# Patient Record
Sex: MALE | Race: WHITE | NOT HISPANIC OR LATINO | Employment: FULL TIME | URBAN - METROPOLITAN AREA
[De-identification: names, ages, dates, MRNs, and addresses within clinical notes are randomized per-mention and may not be internally consistent; named-entity substitution may affect disease eponyms.]

---

## 2019-09-17 RX ORDER — PAROXETINE HYDROCHLORIDE 20 MG/1
TABLET, FILM COATED ORAL
COMMUNITY
Start: 2019-03-25 | End: 2020-05-20

## 2019-09-17 RX ORDER — SIMVASTATIN 20 MG
TABLET ORAL
COMMUNITY
Start: 2017-02-09 | End: 2019-09-18

## 2019-09-17 RX ORDER — ATORVASTATIN CALCIUM 20 MG/1
20 TABLET, FILM COATED ORAL DAILY
COMMUNITY
Start: 2019-05-13 | End: 2020-05-12

## 2019-09-17 RX ORDER — CLOBETASOL PROPIONATE 0.5 MG/G
OINTMENT TOPICAL
COMMUNITY
End: 2019-09-18

## 2019-09-17 RX ORDER — LEVOTHYROXINE SODIUM 0.1 MG/1
100 TABLET ORAL DAILY
COMMUNITY
Start: 2019-06-28

## 2019-09-17 RX ORDER — FLUTICASONE PROPIONATE 0.05 %
CREAM (GRAM) TOPICAL
COMMUNITY
Start: 2014-09-26 | End: 2019-09-18

## 2019-09-18 ENCOUNTER — OFFICE VISIT (OUTPATIENT)
Dept: SURGERY | Facility: CLINIC | Age: 64
End: 2019-09-18

## 2019-09-18 VITALS
WEIGHT: 201 LBS | OXYGEN SATURATION: 97 % | SYSTOLIC BLOOD PRESSURE: 122 MMHG | BODY MASS INDEX: 28.77 KG/M2 | TEMPERATURE: 98.3 F | DIASTOLIC BLOOD PRESSURE: 90 MMHG | HEART RATE: 90 BPM | HEIGHT: 70 IN

## 2019-09-18 DIAGNOSIS — K64.8 INTERNAL HEMORRHOIDS WITH COMPLICATION: Primary | ICD-10-CM

## 2019-09-18 DIAGNOSIS — Z86.010 HISTORY OF COLON POLYPS: ICD-10-CM

## 2019-09-18 PROCEDURE — 46600 DIAGNOSTIC ANOSCOPY SPX: CPT | Performed by: COLON & RECTAL SURGERY

## 2019-09-18 PROCEDURE — 99203 OFFICE O/P NEW LOW 30 MIN: CPT | Performed by: COLON & RECTAL SURGERY

## 2019-09-18 NOTE — PROGRESS NOTES
Shaquille Burns is a 64 y.o. male who is seen as a consult at the request of Gonzalez Salcedo MD for rectal bleeding    HPI:    Pt states about a month ago, he had rectal bleeding: for 4-5 episodes over a 10-day period  He endorses mild anorectal pain currently, not associated with the bleeding  If he has large-caliber stool, his stomach hurts    Sometimes his stools are hard  He takes fiber capsules daily  He does not take a stool softener    He is not applying any creams to his bottom    Most recent colonoscopy 2015: +polyps    FamHx: no known hx colon polyps or colon cancer    Past Medical History:   Diagnosis Date   • Anal fissure 05/21/2001    POSTERIOR MIDLINE   • Anxiety    • Colon polyps     FOLLOWED BY DR. MARISABEL BAE   • Constipation    • Depression    • Disease of thyroid gland     HYPOTHYROIDISM   • GERD (gastroesophageal reflux disease)    • Hemorrhoids    • Hyperlipidemia    • Hypogonadism in male    • It band syndrome, left    • Low testosterone    • HUYEN (obstructive sleep apnea)    • Pneumonia 1967   • Scoliosis        Past Surgical History:   Procedure Laterality Date   • COLONOSCOPY N/A 08/21/2001    ENTIRE COLON WNL, DR.RAYMOND ESTRADA AT [facility]   • COLONOSCOPY W/ POLYPECTOMY N/A 06/03/2015    4 MM HYPERPLASTIC POLYP IN SIGMOID, 4 MM HYPERPLASTIC POLYP IN DESCENDING, RESCOPE IN 5 YRS, DR. MARISABEL BAE AT [facility]   • COLONOSCOPY W/ POLYPECTOMY N/A 03/05/2014    5 MM HYPERPLASTIC POLYP IN HEPATIC FLEXURE, 5 MM ADENOMATOUS POLYP IN TRANSVERSE, 14 MM ADENOMATOUS POLYP IN SIGMOID, 5 MM ADENOMATOUS POLYP IN DISTAL DESCENDING, 6 MM ADENOMATOUS POLYP IN SIGMOID, RESCOPE IN 6 MONTHS, DR. MARISABEL BAE AT [facility]    • KNEE ARTHROSCOPY Right 10/2018   • TONSILLECTOMY AND ADENOIDECTOMY Bilateral        Social History:   reports that he has never smoked. He has never used smokeless tobacco. He reports that he drinks alcohol. He reports that he does not use drugs.      Marriage status: Single    Family History    Problem Relation Age of Onset   • Cancer Mother    • Cancer Father    • Prostate cancer Father    • Heart disease Father    • Cancer Sister          Current Outpatient Medications:   •  atorvastatin (LIPITOR) 20 MG tablet, Take 20 mg by mouth Daily., Disp: , Rfl:   •  levothyroxine (SYNTHROID, LEVOTHROID) 100 MCG tablet, Take 100 mcg by mouth Daily., Disp: , Rfl:   •  PARoxetine (PAXIL) 20 MG tablet, TAKE 1 TABLET BY MOUTH IN THE MORNING, Disp: , Rfl:   •  hydrocortisone (ANUSOL-HC) 2.5 % rectal cream, Insert  into the rectum., Disp: , Rfl:     Allergy  Fish allergy    Review of Systems   Constitution: Positive for weakness and weight gain. Negative for decreased appetite.   HENT: Positive for congestion. Negative for hearing loss and hoarse voice.    Eyes: Negative for blurred vision, discharge and visual disturbance.   Cardiovascular: Negative for chest pain, cyanosis and leg swelling.   Respiratory: Positive for cough. Negative for shortness of breath, sleep disturbances due to breathing and snoring.    Endocrine: Negative for cold intolerance and heat intolerance.   Hematologic/Lymphatic: Does not bruise/bleed easily.   Skin: Positive for itching and poor wound healing. Negative for skin cancer.   Musculoskeletal: Positive for joint pain. Negative for arthritis, back pain and joint swelling.   Gastrointestinal: Positive for abdominal pain, change in bowel habit and constipation. Negative for bowel incontinence.   Genitourinary: Positive for bladder incontinence. Negative for dysuria and hematuria.   Neurological: Positive for excessive daytime sleepiness and headaches. Negative for brief paralysis, dizziness, focal weakness and light-headedness.   Psychiatric/Behavioral: Negative for altered mental status and hallucinations. The patient does not have insomnia.    Allergic/Immunologic: Negative for HIV exposure and persistent infections.   All other systems reviewed and are negative.      Vitals:    09/18/19  1410   BP: 122/90   Pulse: 90   Temp: 98.3 °F (36.8 °C)   SpO2: 97%     Body mass index is 28.84 kg/m².    Physical Exam   Constitutional: He is oriented to person, place, and time. He appears well-developed and well-nourished. No distress.   HENT:   Head: Normocephalic and atraumatic.   Nose: Nose normal.   Mouth/Throat: Oropharynx is clear and moist.   Eyes: Conjunctivae and EOM are normal. Pupils are equal, round, and reactive to light.   Neck: Normal range of motion. No tracheal deviation present.   Pulmonary/Chest: Effort normal and breath sounds normal. No respiratory distress.   Abdominal: Soft. He exhibits no distension.   Genitourinary:   Genitourinary Comments: Perianal exam: external hem - wnl  RENAN- good tone, no masses  Anoscopy performed:  Grade 2 x 2 internal hem: LLat irritated   Musculoskeletal: Normal range of motion. He exhibits no edema or deformity.   Neurological: He is alert and oriented to person, place, and time. No cranial nerve deficit. Coordination and gait normal.   Skin: Skin is warm and dry.   Psychiatric: He has a normal mood and affect. His behavior is normal. Judgment normal.       Review of Medical Record:  I reviewed 2015 colonoscopy records: +polyps    Assessment:  1. Internal hemorrhoids with complication    2. History of colon polyps        Plan:    For the history of colon polyps, I recommend colonoscopy to rule out major sources of bleeding other than hemorrhoids. At the time of colonoscopy if there are no serious issues found at that time, I recommend doing rubber band ligation of the enlarged internal hemorrhoids.  I described risk, benefits and alternatives to the patient.  I described to patient typical post procedure recovery, typical outcomes, and 85% success rate. The patient wishes to proceed.    He should continue a daily fiber supplement and add prn stool softener.  Rx anusol for symptomatic improvement.        Scribed for Phillip Miller MD by Shanna Simental PA-C   9/18/2019   This patient was evaluated by me, recommendations made, documentation reviewed, edited, and revised by me, Phillip Miller MD

## 2020-01-31 ENCOUNTER — PREP FOR SURGERY (OUTPATIENT)
Dept: OTHER | Facility: HOSPITAL | Age: 65
End: 2020-01-31

## 2020-01-31 DIAGNOSIS — Z86.010 HISTORY OF COLON POLYPS: Primary | ICD-10-CM

## 2020-02-04 PROBLEM — Z86.010 HISTORY OF COLON POLYPS: Status: ACTIVE | Noted: 2020-02-04

## 2020-04-28 ENCOUNTER — TELEPHONE (OUTPATIENT)
Dept: SURGERY | Facility: CLINIC | Age: 65
End: 2020-04-28

## 2020-04-28 NOTE — TELEPHONE ENCOUNTER
He should continue to call urology office regarding urinary leakage.  They should have someone on call for medical questions.    Regarding constipation, he should take a daily fiber supplement and increase miralax to one dose twice per day.  He can decrease to 1 dose miralax per day once his bowel movements have improved.  Call back if this does not improve his symptoms by next week.

## 2020-04-28 NOTE — TELEPHONE ENCOUNTER
Issues going on for couple weeks of cant do #2, also leaking #1 tried calling urologist and is not around, denied fever, is taking Miralax & using hydrocortisone cream.    He is on the waiting list to be schedule for his colonoscopy; hx of polyps(Priority #2) so wasn't sure which he should schedule 1st, an office visit or wait for the call.    He is aware that he is on the waiting list for his CY but didn't know what to do with this constipation issue.     Contact [phone]    Please advice.    Thank you.

## 2020-04-28 NOTE — TELEPHONE ENCOUNTER
Phoned patient and relayed Shanna's message will try that he said and hopefully gets resolved by next week.    Thank you.

## 2020-05-20 ENCOUNTER — OFFICE VISIT (OUTPATIENT)
Dept: SURGERY | Facility: CLINIC | Age: 65
End: 2020-05-20

## 2020-05-20 VITALS
HEIGHT: 71 IN | WEIGHT: 207.4 LBS | TEMPERATURE: 98.1 F | OXYGEN SATURATION: 98 % | DIASTOLIC BLOOD PRESSURE: 80 MMHG | HEART RATE: 91 BPM | BODY MASS INDEX: 29.03 KG/M2 | SYSTOLIC BLOOD PRESSURE: 136 MMHG

## 2020-05-20 DIAGNOSIS — Z86.010 HISTORY OF COLON POLYPS: ICD-10-CM

## 2020-05-20 DIAGNOSIS — K64.8 INTERNAL HEMORRHOIDS WITH COMPLICATION: Primary | ICD-10-CM

## 2020-05-20 PROCEDURE — 99213 OFFICE O/P EST LOW 20 MIN: CPT | Performed by: PHYSICIAN ASSISTANT

## 2020-05-20 PROCEDURE — 46600 DIAGNOSTIC ANOSCOPY SPX: CPT | Performed by: PHYSICIAN ASSISTANT

## 2020-05-20 RX ORDER — ATORVASTATIN CALCIUM 20 MG/1
TABLET, FILM COATED ORAL
COMMUNITY
Start: 2020-05-15

## 2020-05-20 RX ORDER — SILDENAFIL 50 MG/1
50 TABLET, FILM COATED ORAL
COMMUNITY
Start: 2020-01-08 | End: 2021-01-07

## 2020-05-20 RX ORDER — TAMSULOSIN HYDROCHLORIDE 0.4 MG/1
0.4 CAPSULE ORAL DAILY
COMMUNITY
Start: 2020-05-05 | End: 2021-05-05

## 2020-05-20 RX ORDER — DULOXETIN HYDROCHLORIDE 60 MG/1
60 CAPSULE, DELAYED RELEASE ORAL DAILY
COMMUNITY
Start: 2020-01-08 | End: 2021-01-07

## 2020-05-20 NOTE — PROGRESS NOTES
"Shaquille Burns is a 65 y.o. male in for follow up of Internal hemorrhoids with complication    History of colon polyps    Pt states his bottom still hurts.  HC cream did not help  He is not seeing any bleeding    He did not have a BM for 5 days recently  He took 2 doses miralax, and he was able to have a BM  Now he has added prune juice.  He does miralax + prune juice now, and his BMs are more regular    He sees uro Dr. Goetz for urinary dribbling and enlarged prostate    /80 (BP Location: Left arm, Patient Position: Sitting, Cuff Size: Adult)   Pulse 91   Temp 98.1 °F (36.7 °C) (Temporal)   Ht 180.3 cm (71\")   Wt 94.1 kg (207 lb 6.4 oz)   SpO2 98%   BMI 28.93 kg/m²   Body mass index is 28.93 kg/m².      PE:  Physical Exam   Constitutional: He is oriented to person, place, and time. He appears well-developed and well-nourished. No distress.   HENT:   Head: Normocephalic and atraumatic.   Eyes: Pupils are equal, round, and reactive to light.   Neck: No tracheal deviation present.   Pulmonary/Chest: Effort normal. No respiratory distress.   Abdominal: He exhibits no distension.   Genitourinary:   Genitourinary Comments: Perianal exam: external hem - wnl  RENAN- good tone, no masses  Anoscopy performed:  Grade 3 x 1 internal hem RP   Neurological: He is alert and oriented to person, place, and time.   Skin: Skin is warm and dry. He is not diaphoretic.   Psychiatric: He has a normal mood and affect. His behavior is normal.   Vitals reviewed.        Assessment:   1. Internal hemorrhoids with complication    2. History of colon polyps         Plan:    For the hx colon polyps, I recommend colonoscopy with Dr. Miller.. At the time of colonoscopy if there are no serious issues found at that time, I recommend doing rubber band ligation of the enlarged internal hemorrhoids.  I described risk, benefits and alternatives to the patient.  I described to patient typical post procedure recovery, typical outcomes, and 85% " success rate. The patient wishes to proceed.    To help improve his BMs, I recommend fiber therapy and detailed and gave written instructions on how to achieve a high fiber diet.      Shanna Simental PA-C  5/20/2020     15 minutes spent face-to-face with pt; 10 of 15 minutes spent in consultation

## 2020-06-15 ENCOUNTER — TRANSCRIBE ORDERS (OUTPATIENT)
Dept: SLEEP MEDICINE | Facility: HOSPITAL | Age: 65
End: 2020-06-15

## 2020-06-15 DIAGNOSIS — Z01.818 OTHER SPECIFIED PRE-OPERATIVE EXAMINATION: Primary | ICD-10-CM

## 2020-06-16 ENCOUNTER — LAB (OUTPATIENT)
Dept: LAB | Facility: HOSPITAL | Age: 65
End: 2020-06-16

## 2020-06-16 DIAGNOSIS — Z01.818 OTHER SPECIFIED PRE-OPERATIVE EXAMINATION: ICD-10-CM

## 2020-06-16 PROCEDURE — U0004 COV-19 TEST NON-CDC HGH THRU: HCPCS

## 2020-06-17 LAB
REF LAB TEST METHOD: NORMAL
SARS-COV-2 RNA RESP QL NAA+PROBE: NOT DETECTED

## 2020-06-18 ENCOUNTER — ANESTHESIA (OUTPATIENT)
Dept: GASTROENTEROLOGY | Facility: HOSPITAL | Age: 65
End: 2020-06-18

## 2020-06-18 ENCOUNTER — HOSPITAL ENCOUNTER (OUTPATIENT)
Facility: HOSPITAL | Age: 65
Setting detail: HOSPITAL OUTPATIENT SURGERY
Discharge: HOME | End: 2020-06-18
Attending: COLON & RECTAL SURGERY | Admitting: COLON & RECTAL SURGERY

## 2020-06-18 ENCOUNTER — ANESTHESIA EVENT (OUTPATIENT)
Dept: GASTROENTEROLOGY | Facility: HOSPITAL | Age: 65
End: 2020-06-18

## 2020-06-18 VITALS
SYSTOLIC BLOOD PRESSURE: 137 MMHG | HEART RATE: 77 BPM | OXYGEN SATURATION: 99 % | RESPIRATION RATE: 17 BRPM | HEIGHT: 71 IN | WEIGHT: 200.4 LBS | DIASTOLIC BLOOD PRESSURE: 87 MMHG | BODY MASS INDEX: 28.06 KG/M2 | TEMPERATURE: 98.6 F

## 2020-06-18 DIAGNOSIS — Z86.010 HISTORY OF COLON POLYPS: ICD-10-CM

## 2020-06-18 PROCEDURE — 45385 COLONOSCOPY W/LESION REMOVAL: CPT | Performed by: COLON & RECTAL SURGERY

## 2020-06-18 PROCEDURE — 45398 COLONOSCOPY W/BAND LIGATION: CPT | Performed by: COLON & RECTAL SURGERY

## 2020-06-18 PROCEDURE — 25010000002 PROPOFOL 10 MG/ML EMULSION: Performed by: ANESTHESIOLOGY

## 2020-06-18 PROCEDURE — 25010000002 KETOROLAC TROMETHAMINE PER 15 MG: Performed by: ANESTHESIOLOGY

## 2020-06-18 PROCEDURE — 45380 COLONOSCOPY AND BIOPSY: CPT | Performed by: COLON & RECTAL SURGERY

## 2020-06-18 PROCEDURE — 88305 TISSUE EXAM BY PATHOLOGIST: CPT | Performed by: COLON & RECTAL SURGERY

## 2020-06-18 RX ORDER — PROPOFOL 10 MG/ML
VIAL (ML) INTRAVENOUS AS NEEDED
Status: DISCONTINUED | OUTPATIENT
Start: 2020-06-18 | End: 2020-06-18 | Stop reason: SURG

## 2020-06-18 RX ORDER — BUPIVACAINE HYDROCHLORIDE AND EPINEPHRINE 5; 5 MG/ML; UG/ML
INJECTION, SOLUTION PERINEURAL AS NEEDED
Status: DISCONTINUED | OUTPATIENT
Start: 2020-06-18 | End: 2020-06-18 | Stop reason: HOSPADM

## 2020-06-18 RX ORDER — PROPOFOL 10 MG/ML
VIAL (ML) INTRAVENOUS CONTINUOUS PRN
Status: DISCONTINUED | OUTPATIENT
Start: 2020-06-18 | End: 2020-06-18 | Stop reason: SURG

## 2020-06-18 RX ORDER — LIDOCAINE HYDROCHLORIDE 20 MG/ML
INJECTION, SOLUTION INFILTRATION; PERINEURAL AS NEEDED
Status: DISCONTINUED | OUTPATIENT
Start: 2020-06-18 | End: 2020-06-18 | Stop reason: SURG

## 2020-06-18 RX ORDER — SODIUM CHLORIDE, SODIUM LACTATE, POTASSIUM CHLORIDE, CALCIUM CHLORIDE 600; 310; 30; 20 MG/100ML; MG/100ML; MG/100ML; MG/100ML
30 INJECTION, SOLUTION INTRAVENOUS CONTINUOUS PRN
Status: DISCONTINUED | OUTPATIENT
Start: 2020-06-18 | End: 2020-06-18 | Stop reason: HOSPADM

## 2020-06-18 RX ORDER — KETOROLAC TROMETHAMINE 30 MG/ML
INJECTION, SOLUTION INTRAMUSCULAR; INTRAVENOUS AS NEEDED
Status: DISCONTINUED | OUTPATIENT
Start: 2020-06-18 | End: 2020-06-18 | Stop reason: SURG

## 2020-06-18 RX ORDER — SODIUM CHLORIDE 0.9 % (FLUSH) 0.9 %
10 SYRINGE (ML) INJECTION AS NEEDED
Status: DISCONTINUED | OUTPATIENT
Start: 2020-06-18 | End: 2020-06-18 | Stop reason: HOSPADM

## 2020-06-18 RX ADMIN — SODIUM CHLORIDE, POTASSIUM CHLORIDE, SODIUM LACTATE AND CALCIUM CHLORIDE 30 ML/HR: 600; 310; 30; 20 INJECTION, SOLUTION INTRAVENOUS at 10:37

## 2020-06-18 RX ADMIN — LIDOCAINE HYDROCHLORIDE 100 MG: 20 INJECTION, SOLUTION INFILTRATION; PERINEURAL at 11:16

## 2020-06-18 RX ADMIN — KETOROLAC TROMETHAMINE 30 MG: 30 INJECTION, SOLUTION INTRAMUSCULAR; INTRAVENOUS at 11:18

## 2020-06-18 RX ADMIN — PROPOFOL 100 MG: 10 INJECTION, EMULSION INTRAVENOUS at 11:16

## 2020-06-18 RX ADMIN — PROPOFOL 100 MCG/KG/MIN: 10 INJECTION, EMULSION INTRAVENOUS at 11:16

## 2020-06-18 NOTE — DISCHARGE INSTRUCTIONS

## 2020-06-18 NOTE — H&P
Shaquille Burns is a 65 y.o. male  who is referred by Phillip Bae MD for a colonoscopy. He   has an indications: previous adenomatous polyp and irritated hems.     He denies any change in bowel function, melena, or hematochezia.    Past Medical History:   Diagnosis Date   • Anal fissure 05/21/2001    POSTERIOR MIDLINE   • Anxiety    • BPH (benign prostatic hyperplasia)    • Chronic cough    • Colon polyps     FOLLOWED BY DR. PHILLIP BAE   • Constipation     FUNCTIONAL   • Depression    • Disease of thyroid gland     HYPOTHYROIDISM   • Eczema    • ED (erectile dysfunction)    • GERD (gastroesophageal reflux disease)    • Hemorrhoids    • Hyperlinear palms    • Hyperlipidemia     MIXED   • Hypogonadism in male    • It band syndrome, left    • Low testosterone    • Memory loss    • Neurasthenia 12/2014   • Pneumonia 1967   • Radiculopathy affecting upper extremity 02/2016   • Scoliosis    • Seasonal allergies        Past Surgical History:   Procedure Laterality Date   • COLONOSCOPY N/A 08/21/2001    ENTIRE COLON WNL, DR.RAYMOND ESTRADA AT [facility]   • COLONOSCOPY W/ POLYPECTOMY N/A 06/03/2015    4 MM HYPERPLASTIC POLYP IN SIGMOID, 4 MM HYPERPLASTIC POLYP IN DESCENDING, RESCOPE IN 5 YRS, DR. PHILLIP BAE AT [facility]   • COLONOSCOPY W/ POLYPECTOMY N/A 03/05/2014    5 MM HYPERPLASTIC POLYP IN HEPATIC FLEXURE, 5 MM ADENOMATOUS POLYP IN TRANSVERSE, 14 MM ADENOMATOUS POLYP IN SIGMOID, 5 MM ADENOMATOUS POLYP IN DISTAL DESCENDING, 6 MM ADENOMATOUS POLYP IN SIGMOID, RESCOPE IN 6 MONTHS, DR. PHILLIP BAE AT [facility]    • KNEE ARTHROSCOPY Right 10/2018   • TONSILLECTOMY AND ADENOIDECTOMY Bilateral        Medications Prior to Admission   Medication Sig Dispense Refill Last Dose   • atorvastatin (LIPITOR) 20 MG tablet TAKE ONE TABLET BY MOUTH EVERY NIGHT   6/17/2020 at Unknown time   • DULoxetine (CYMBALTA) 60 MG capsule Take 60 mg by mouth Daily.   6/18/2020 at Unknown time   • levothyroxine (SYNTHROID, LEVOTHROID) 100 MCG tablet Take  100 mcg by mouth Daily.   6/18/2020 at Unknown time   • tamsulosin (FLOMAX) 0.4 MG capsule 24 hr capsule Take 0.4 mg by mouth Daily.   Past Week at Unknown time   • sildenafil (VIAGRA) 50 MG tablet Take 50 mg by mouth.          Allergies   Allergen Reactions   • Fish Allergy Anxiety, Hives, Itching and Swelling       Family History   Problem Relation Age of Onset   • Cancer Mother    • Cancer Father    • Prostate cancer Father    • Heart disease Father    • Cancer Sister        Social History     Socioeconomic History   • Marital status: Single     Spouse name: Not on file   • Number of children: Not on file   • Years of education: Not on file   • Highest education level: Not on file   Tobacco Use   • Smoking status: Never Smoker   • Smokeless tobacco: Never Used   Substance and Sexual Activity   • Alcohol use: Yes     Frequency: Never     Comment: RARELY   • Drug use: No   • Sexual activity: Defer       Review of Systems   Gastrointestinal: Negative for abdominal pain, nausea and vomiting.   All other systems reviewed and are negative.      Vitals:    06/18/20 1033   BP: 139/96   Pulse: 81   Resp: 16   Temp: 98.6 °F (37 °C)   SpO2: 97%         Physical Exam   Constitutional: He is oriented to person, place, and time. He appears well-developed and well-nourished.   HENT:   Head: Normocephalic and atraumatic.   Eyes: Pupils are equal, round, and reactive to light. EOM are normal.   Cardiovascular: Regular rhythm.   Pulmonary/Chest: Effort normal.   Abdominal: Soft. He exhibits no distension.   Musculoskeletal: Normal range of motion.   Neurological: He is alert and oriented to person, place, and time.   Skin: Skin is warm and dry.   Psychiatric: Judgment and thought content normal.         Assessment/Plan      indications: previous adenomatous polyp +hem          I recommend colonoscopy +/- RBL.  I described risks, benefits of the procedure with the patient including but not limited to bleeding, infection,  possibility of perforation and possible polypectomy. All of the patient's questions were answered and they would like to proceed with the above recommendations.

## 2020-06-18 NOTE — ANESTHESIA PREPROCEDURE EVALUATION
Anesthesia Evaluation     Patient summary reviewed and Nursing notes reviewed   no history of anesthetic complications:               Airway   Mallampati: III  TM distance: >3 FB  Neck ROM: limited  No difficulty expected  Dental      Pulmonary - normal exam   (-) not a smoker  Cardiovascular - normal exam    (+) hypertension,   (-) angina      Neuro/Psych  (+) psychiatric history Anxiety,     GI/Hepatic/Renal/Endo    (+)   thyroid problem hypothyroidism    ROS Comment: P/H Polyps and internal hemorrhoids     Musculoskeletal     Abdominal    Substance History      OB/GYN          Other                        Anesthesia Plan    ASA 2     MAC       Anesthetic plan, all risks, benefits, and alternatives have been provided, discussed and informed consent has been obtained with: patient.

## 2020-06-18 NOTE — ANESTHESIA POSTPROCEDURE EVALUATION
Patient: Shaquille Burns    Procedure Summary     Date:  06/18/20 Room / Location:   [facility]    Anesthesia Start:  1116 Anesthesia Stop:  1153    Procedures:       HEMORRHOID BANDING x 3 (N/A Rectum)      COLONOSCOPY to cecum with hot snare polypectomies (N/A ) Diagnosis:       History of colon polyps      (History of colon polyps [Z86.010])    Surgeon:  Phillip Miller MD Provider:  Jim Duckworth MD    Anesthesia Type:  MAC ASA Status:  2          Anesthesia Type: MAC    Vitals  No vitals data found for the desired time range.          Post Anesthesia Care and Evaluation    Patient location during evaluation: PHASE II  Anesthetic complications: No anesthetic complications

## 2020-06-19 LAB
CYTO UR: NORMAL
LAB AP CASE REPORT: NORMAL
LAB AP CLINICAL INFORMATION: NORMAL
PATH REPORT.FINAL DX SPEC: NORMAL
PATH REPORT.GROSS SPEC: NORMAL

## 2021-03-16 ENCOUNTER — BULK ORDERING (OUTPATIENT)
Dept: CASE MANAGEMENT | Facility: OTHER | Age: 66
End: 2021-03-16

## 2021-03-16 DIAGNOSIS — Z23 IMMUNIZATION DUE: ICD-10-CM

## 2021-03-18 ENCOUNTER — IMMUNIZATION (OUTPATIENT)
Dept: VACCINE CLINIC | Facility: HOSPITAL | Age: 66
End: 2021-03-18

## 2021-03-18 PROCEDURE — 0001A: CPT | Performed by: INTERNAL MEDICINE

## 2021-03-18 PROCEDURE — 91300 HC SARSCOV02 VAC 30MCG/0.3ML IM: CPT | Performed by: INTERNAL MEDICINE

## 2021-04-08 ENCOUNTER — IMMUNIZATION (OUTPATIENT)
Dept: VACCINE CLINIC | Facility: HOSPITAL | Age: 66
End: 2021-04-08

## 2021-04-08 PROCEDURE — 91300 HC SARSCOV02 VAC 30MCG/0.3ML IM: CPT | Performed by: INTERNAL MEDICINE

## 2021-04-08 PROCEDURE — 0002A: CPT | Performed by: INTERNAL MEDICINE

## 2023-02-20 ENCOUNTER — DOCUMENTATION (OUTPATIENT)
Dept: SURGERY | Facility: CLINIC | Age: 68
End: 2023-02-20
Payer: MEDICARE

## 2023-02-20 NOTE — PROGRESS NOTES
3 YEARS COLONOSCOPY, LAST DONE 06/18/2020.    ASK FOR JACY OR DARLIN.     02/21/2023, [facility] RECALL LETTER MAILED TO PATIENT, EDUIN TALAMANTES.

## 2023-03-09 ENCOUNTER — TELEPHONE (OUTPATIENT)
Dept: SURGERY | Facility: CLINIC | Age: 68
End: 2023-03-09
Payer: MEDICARE

## 2023-05-22 ENCOUNTER — PREP FOR SURGERY (OUTPATIENT)
Dept: OTHER | Facility: HOSPITAL | Age: 68
End: 2023-05-22
Payer: MEDICARE

## 2023-05-22 DIAGNOSIS — Z83.71 FAMILY HISTORY OF COLONIC POLYPS: ICD-10-CM

## 2023-05-22 DIAGNOSIS — Z86.010 PERSONAL HISTORY OF COLONIC POLYPS: Primary | ICD-10-CM

## 2023-05-26 PROBLEM — Z83.71 FAMILY HISTORY OF COLONIC POLYPS: Status: ACTIVE | Noted: 2023-05-26

## 2023-08-14 ENCOUNTER — TELEPHONE (OUTPATIENT)
Dept: SURGERY | Facility: CLINIC | Age: 68
End: 2023-08-14
Payer: MEDICARE

## 2023-08-15 RX ORDER — TAMSULOSIN HYDROCHLORIDE 0.4 MG/1
1 CAPSULE ORAL DAILY
COMMUNITY

## 2023-08-15 RX ORDER — IBUPROFEN 200 MG
200-400 TABLET ORAL EVERY 6 HOURS PRN
COMMUNITY

## 2023-08-15 RX ORDER — AMLODIPINE BESYLATE AND BENAZEPRIL HYDROCHLORIDE 5; 10 MG/1; MG/1
1 CAPSULE ORAL EVERY EVENING
COMMUNITY
Start: 2023-07-03

## 2023-08-15 RX ORDER — PAROXETINE 30 MG/1
30 TABLET, FILM COATED ORAL EVERY MORNING
COMMUNITY

## 2023-08-16 ENCOUNTER — ANESTHESIA EVENT (OUTPATIENT)
Dept: GASTROENTEROLOGY | Facility: HOSPITAL | Age: 68
End: 2023-08-16
Payer: COMMERCIAL

## 2023-08-16 ENCOUNTER — ANESTHESIA (OUTPATIENT)
Dept: GASTROENTEROLOGY | Facility: HOSPITAL | Age: 68
End: 2023-08-16
Payer: COMMERCIAL

## 2023-08-16 ENCOUNTER — HOSPITAL ENCOUNTER (OUTPATIENT)
Facility: HOSPITAL | Age: 68
Setting detail: HOSPITAL OUTPATIENT SURGERY
Discharge: HOME | End: 2023-08-16
Attending: COLON & RECTAL SURGERY | Admitting: COLON & RECTAL SURGERY
Payer: COMMERCIAL

## 2023-08-16 VITALS
OXYGEN SATURATION: 98 % | TEMPERATURE: 97.8 F | WEIGHT: 202 LBS | RESPIRATION RATE: 18 BRPM | BODY MASS INDEX: 28.28 KG/M2 | HEIGHT: 71 IN | DIASTOLIC BLOOD PRESSURE: 69 MMHG | HEART RATE: 80 BPM | SYSTOLIC BLOOD PRESSURE: 121 MMHG

## 2023-08-16 DIAGNOSIS — Z86.010 PERSONAL HISTORY OF COLONIC POLYPS: ICD-10-CM

## 2023-08-16 DIAGNOSIS — Z83.71 FAMILY HISTORY OF COLONIC POLYPS: ICD-10-CM

## 2023-08-16 PROCEDURE — 25010000002 PROPOFOL 10 MG/ML EMULSION: Performed by: NURSE ANESTHETIST, CERTIFIED REGISTERED

## 2023-08-16 PROCEDURE — 88305 TISSUE EXAM BY PATHOLOGIST: CPT | Performed by: COLON & RECTAL SURGERY

## 2023-08-16 RX ORDER — LIDOCAINE HYDROCHLORIDE 20 MG/ML
INJECTION, SOLUTION INFILTRATION; PERINEURAL AS NEEDED
Status: DISCONTINUED | OUTPATIENT
Start: 2023-08-16 | End: 2023-08-16 | Stop reason: SURG

## 2023-08-16 RX ORDER — PROPOFOL 10 MG/ML
VIAL (ML) INTRAVENOUS AS NEEDED
Status: DISCONTINUED | OUTPATIENT
Start: 2023-08-16 | End: 2023-08-16 | Stop reason: SURG

## 2023-08-16 RX ORDER — SODIUM CHLORIDE, SODIUM LACTATE, POTASSIUM CHLORIDE, CALCIUM CHLORIDE 600; 310; 30; 20 MG/100ML; MG/100ML; MG/100ML; MG/100ML
30 INJECTION, SOLUTION INTRAVENOUS CONTINUOUS PRN
Status: DISCONTINUED | OUTPATIENT
Start: 2023-08-16 | End: 2023-08-16 | Stop reason: HOSPADM

## 2023-08-16 RX ADMIN — LIDOCAINE HYDROCHLORIDE 100 MG: 20 INJECTION, SOLUTION INFILTRATION; PERINEURAL at 10:45

## 2023-08-16 RX ADMIN — PROPOFOL 100 MG: 10 INJECTION, EMULSION INTRAVENOUS at 10:45

## 2023-08-16 RX ADMIN — PROPOFOL 300 MCG/KG/MIN: 10 INJECTION, EMULSION INTRAVENOUS at 10:45

## 2023-08-16 RX ADMIN — SODIUM CHLORIDE, POTASSIUM CHLORIDE, SODIUM LACTATE AND CALCIUM CHLORIDE 30 ML/HR: 600; 310; 30; 20 INJECTION, SOLUTION INTRAVENOUS at 10:10

## 2023-08-16 NOTE — ANESTHESIA PREPROCEDURE EVALUATION
Anesthesia Evaluation     Patient summary reviewed and Nursing notes reviewed   NPO Solid Status: > 8 hours  NPO Liquid Status: > 4 hours           Airway   Mallampati: II  TM distance: >3 FB  Neck ROM: full  No difficulty expected  Dental - normal exam     Pulmonary - normal exam   Cardiovascular - normal exam    (+) hypertension 2 medications or greaterhyperlipidemia      Neuro/Psych  (+) numbness, psychiatric history Depression and Anxiety  GI/Hepatic/Renal/Endo    (+) GERD, thyroid problem hypothyroidism    Musculoskeletal     Abdominal  - normal exam   Substance History      OB/GYN          Other                        Anesthesia Plan    ASA 2     MAC     intravenous induction     Anesthetic plan, risks, benefits, and alternatives have been provided, discussed and informed consent has been obtained with: patient.      CODE STATUS:

## 2023-08-16 NOTE — ANESTHESIA POSTPROCEDURE EVALUATION
Patient: Shaquille Burns    Procedure Summary       Date: 08/16/23 Room / Location:  [facility]    Anesthesia Start: 1041 Anesthesia Stop: 1107    Procedure: COLONOSCOPY to cecum with biopsy / polypectomies Diagnosis:       Personal history of colonic polyps      Family history of colonic polyps      (Personal history of colonic polyps [Z86.010])      (Family history of colonic polyps [Z83.71])    Surgeons: Phillip Miller MD Provider: Dayday William MD    Anesthesia Type: MAC ASA Status: 2            Anesthesia Type: MAC    Vitals  Vitals Value Taken Time   /69 08/16/23 1125   Temp     Pulse 80 08/16/23 1125   Resp 18 08/16/23 1125   SpO2 98 % 08/16/23 1125           Post Anesthesia Care and Evaluation    Patient location during evaluation: PHASE II  Patient participation: complete - patient participated  Level of consciousness: awake and alert  Pain management: adequate    Airway patency: patent  Anesthetic complications: No anesthetic complications  PONV Status: none  Cardiovascular status: acceptable and hemodynamically stable  Respiratory status: acceptable, nonlabored ventilation and spontaneous ventilation  Hydration status: acceptable

## 2023-08-16 NOTE — H&P
Shaquille Burns is a 68 y.o. male  who is referred by Phillip Bae MD for a colonoscopy. He   has an indications: previous adenomatous polyp.     He denies any change in bowel function, melena, or hematochezia.    Past Medical History:   Diagnosis Date    Anal fissure 05/21/2001    POSTERIOR MIDLINE    Anxiety     BPH (benign prostatic hyperplasia)     Chronic cough     Colon polyps     FOLLOWED BY DR. PHILLIP BAE    Constipation     FUNCTIONAL    Depression     Disease of thyroid gland     HYPOTHYROIDISM    Eczema     ED (erectile dysfunction)     GERD (gastroesophageal reflux disease)     Hemorrhoids     Hyperlinear palms     Hyperlipidemia     MIXED    Hypertension     Hypogonadism in male     It band syndrome, left     Low testosterone     Memory loss     Neurasthenia 12/2014    Pneumonia 1967    Radiculopathy affecting upper extremity 02/2016    Scoliosis     Seasonal allergies        Past Surgical History:   Procedure Laterality Date    COLONOSCOPY N/A 08/21/2001    ENTIRE COLON WNL, DR.RAYMOND ESTRADA AT [facility]    COLONOSCOPY N/A 6/18/2020    5 MM TUBULAR ADENOMA POLYP IN TRANSVERSE, 1 HYPERPLASTIC POLYP IN DESCENDING, 1 TUBULAR ADENOMA POLYP IN DESCENDING, 4 MM TUBULAR ADENOMA POLYP IN SIGMOID, GRADE 2 INTERNAL HEMORRHOIDS, RESCOPE IN 3 YRS, DR. PHILLIP BAE AT [facility]    COLONOSCOPY W/ POLYPECTOMY N/A 06/03/2015    4 MM HYPERPLASTIC POLYP IN SIGMOID, 4 MM HYPERPLASTIC POLYP IN DESCENDING, RESCOPE IN 5 YRS, DR. PHILLIP BAE AT [facility]    COLONOSCOPY W/ POLYPECTOMY N/A 03/05/2014    5 MM HYPERPLASTIC POLYP IN HEPATIC FLEXURE, 5 MM ADENOMATOUS POLYP IN TRANSVERSE, 14 MM ADENOMATOUS POLYP IN SIGMOID, 5 MM ADENOMATOUS POLYP IN DISTAL DESCENDING, 6 MM ADENOMATOUS POLYP IN SIGMOID, RESCOPE IN 6 MONTHS, DR. PHILLIP BAE AT [facility]     HEMORRHOIDECTOMY N/A 6/18/2020    Procedure: HEMORRHOID BANDING x 3;  Surgeon: Phillip Bae MD;  Location: [facility] ENDOSCOPY;  Service: General;  Laterality: N/A;   pre-hemorrhoids  post-same     KNEE ARTHROSCOPY Right 10/2018    TONSILLECTOMY AND ADENOIDECTOMY Bilateral        Medications Prior to Admission   Medication Sig Dispense Refill Last Dose    amLODIPine-benazepril (LOTREL 5-10) 5-10 MG per capsule Take 1 capsule by mouth Every Evening. WILL HOLD FOR SURGERY   8/14/2023    atorvastatin (LIPITOR) 20 MG tablet Take 1 tablet by mouth Every Night.   8/14/2023    ibuprofen (ADVIL,MOTRIN) 200 MG tablet Take 1-2 tablets by mouth Every 6 (Six) Hours As Needed for Mild Pain.   8/14/2023    levothyroxine (SYNTHROID, LEVOTHROID) 100 MCG tablet Take 1 tablet by mouth Daily.   8/14/2023    PARoxetine (PAXIL) 30 MG tablet Take 1 tablet by mouth Every Morning.   8/15/2023    tamsulosin (FLOMAX) 0.4 MG capsule 24 hr capsule Take 1 capsule by mouth Daily.   8/14/2023       Allergies   Allergen Reactions    Fish Allergy Anxiety, Hives, Itching and Swelling       Family History   Problem Relation Age of Onset    Cancer Mother     Cancer Father     Prostate cancer Father     Heart disease Father     Cancer Sister        Social History     Socioeconomic History    Marital status: Single   Tobacco Use    Smoking status: Never    Smokeless tobacco: Never   Vaping Use    Vaping Use: Never used   Substance and Sexual Activity    Alcohol use: Yes     Comment: RARELY    Drug use: No    Sexual activity: Defer       Review of Systems   Gastrointestinal:  Negative for abdominal pain, nausea and vomiting.   All other systems reviewed and are negative.    Vitals:    08/16/23 0945   BP: 138/99   Pulse: 79   Resp: 16   Temp: 97.8 øF (36.6 øC)   SpO2: 98%         Physical Exam  Exam conducted with a chaperone present.   Constitutional:       General: He is not in acute distress.     Appearance: He is well-developed.   HENT:      Head: Normocephalic and atraumatic.      Nose: Nose normal.   Eyes:      Conjunctiva/sclera: Conjunctivae normal.      Pupils: Pupils are equal, round, and reactive to light.    Neck:      Trachea: No tracheal deviation.   Pulmonary:      Effort: Pulmonary effort is normal. No respiratory distress.      Breath sounds: Normal breath sounds.   Abdominal:      General: Bowel sounds are normal. There is no distension.      Palpations: Abdomen is soft.   Musculoskeletal:         General: No deformity. Normal range of motion.      Cervical back: Normal range of motion.   Skin:     General: Skin is warm and dry.   Neurological:      Mental Status: He is alert and oriented to person, place, and time.      Cranial Nerves: No cranial nerve deficit.      Coordination: Coordination normal.      Gait: Gait normal.   Psychiatric:         Behavior: Behavior normal.         Judgment: Judgment normal.         Assessment & Plan      indications: previous adenomatous polyp         I recommend colonoscopy.  I described risks, benefits of the procedure with the patient including but not limited to bleeding, infection, possibility of perforation and possible polypectomy. All of the patient's questions were answered and they would like to proceed with the above recommendations.

## 2023-08-17 LAB
LAB AP CASE REPORT: NORMAL
LAB AP CLINICAL INFORMATION: NORMAL
PATH REPORT.FINAL DX SPEC: NORMAL
PATH REPORT.GROSS SPEC: NORMAL

## (undated) DEVICE — LN SMPL CO2 SHTRM SD STREAM W/M LUER

## (undated) DEVICE — SINGLE-USE BIOPSY FORCEPS: Brand: RADIAL JAW 4

## (undated) DEVICE — THE SINGLE USE ETRAP – POLYP TRAP IS USED FOR SUCTION RETRIEVAL OF ENDOSCOPICALLY REMOVED POLYPS.: Brand: ETRAP

## (undated) DEVICE — ADAPT CLN BIOGUARD AIR/H2O DISP

## (undated) DEVICE — SENSR O2 OXIMAX FNGR A/ 18IN NONSTR

## (undated) DEVICE — SNAR POLYP SENSATION STDOVL 27 240 BX40

## (undated) DEVICE — GOWN SURG AERO CHROME XL

## (undated) DEVICE — PAD GRND REM POLYHESIVE A/ DISP

## (undated) DEVICE — CANN O2 ETCO2 FITS ALL CONN CO2 SMPL A/ 7IN DISP LF

## (undated) DEVICE — THE TORRENT IRRIGATION SCOPE CONNECTOR IS USED WITH THE TORRENT IRRIGATION TUBING TO PROVIDE IRRIGATION FLUIDS SUCH AS STERILE WATER DURING GASTROINTESTINAL ENDOSCOPIC PROCEDURES WHEN USED IN CONJUNCTION WITH AN IRRIGATION PUMP (OR ELECTROSURGICAL UNIT).: Brand: TORRENT

## (undated) DEVICE — TUBING, SUCTION, 1/4" X 10', STRAIGHT: Brand: MEDLINE

## (undated) DEVICE — KT ORCA ORCAPOD DISP STRL